# Patient Record
Sex: MALE | Race: WHITE | Employment: FULL TIME | URBAN - METROPOLITAN AREA
[De-identification: names, ages, dates, MRNs, and addresses within clinical notes are randomized per-mention and may not be internally consistent; named-entity substitution may affect disease eponyms.]

---

## 2018-09-18 ENCOUNTER — HOSPITAL ENCOUNTER (OUTPATIENT)
Dept: NON INVASIVE DIAGNOSTICS | Age: 48
Discharge: HOME | End: 2018-09-18
Attending: FAMILY MEDICINE
Payer: COMMERCIAL

## 2018-09-18 DIAGNOSIS — R00.2 PALPITATIONS: ICD-10-CM

## 2018-09-18 PROCEDURE — 93270 REMOTE 30 DAY ECG REV/REPORT: CPT

## 2018-10-26 NOTE — PROCEDURES
[address] [facility]  MR#: [id number]  : 1970  ACCOUNT #: [de-identified]   DATE OF SERVICE: 10/20/2018    DATE OF RECORDIN/21 through 10/20/2018    A total of more than 150 patient triggered events were noted. Rhythm included sinus rhythm, sinus rhythm with PVCs and sinus tachycardia. Symptoms of skipped beat were mostly correlated with PVCs, but occasionally with sinus rhythm.       MD MIGEL Zimmerman / DIMPLE  D: 10/26/2018 08:27     T: 10/26/2018 09:46  JOB #: [id number]

## 2022-09-28 ENCOUNTER — HOSPITAL ENCOUNTER (OUTPATIENT)
Dept: VASCULAR SURGERY | Age: 52
Discharge: HOME | End: 2022-09-28
Attending: FAMILY MEDICINE
Payer: COMMERCIAL

## 2022-09-28 DIAGNOSIS — M79.89 LEG SWELLING: ICD-10-CM

## 2022-09-28 DIAGNOSIS — R00.2 PALPITATIONS: ICD-10-CM

## 2022-09-28 PROCEDURE — 93971 EXTREMITY STUDY: CPT

## 2022-09-28 PROCEDURE — 93880 EXTRACRANIAL BILAT STUDY: CPT

## 2022-09-29 PROBLEM — I77.9 CAROTID ARTERY DISEASE (HCC): Status: ACTIVE | Noted: 2022-09-29

## 2022-09-29 LAB
LEFT CCA DIST DIAS: 29 CM/S
LEFT CCA DIST SYS: 90.4 CM/S
LEFT CCA PROX DIAS: 32.1 CM/S
LEFT CCA PROX SYS: 125.3 CM/S
LEFT ECA DIAS: 18.9 CM/S
LEFT ECA SYS: 148.3 CM/S
LEFT ICA DIST DIAS: 30.3 CM/S
LEFT ICA DIST SYS: 65.5 CM/S
LEFT ICA MID DIAS: 35.8 CM/S
LEFT ICA MID SYS: 77.6 CM/S
LEFT ICA PROX DIAS: 30.3 CM/S
LEFT ICA PROX SYS: 72.1 CM/S
LEFT ICA/CCA SYS: 0.9 NO UNITS
LEFT VERTEBRAL DIAS: 17.6 CM/S
LEFT VERTEBRAL SYS: 45.9 CM/S
RIGHT CCA DIST DIAS: 25.5 CM/S
RIGHT CCA DIST SYS: 90.7 CM/S
RIGHT CCA PROX DIAS: 20.3 CM/S
RIGHT CCA PROX SYS: 93.3 CM/S
RIGHT ECA DIAS: 21.6 CM/S
RIGHT ECA SYS: 103.7 CM/S
RIGHT ICA DIST DIAS: 32 CM/S
RIGHT ICA DIST SYS: 78.9 CM/S
RIGHT ICA MID DIAS: 41.2 CM/S
RIGHT ICA MID SYS: 88.1 CM/S
RIGHT ICA PROX DIAS: 30.7 CM/S
RIGHT ICA PROX SYS: 65.9 CM/S
RIGHT ICA/CCA SYS: 1 NO UNITS
RIGHT VERTEBRAL DIAS: 18.5 CM/S
RIGHT VERTEBRAL SYS: 47.8 CM/S

## 2023-05-23 RX ORDER — TELMISARTAN AND HYDROCHLORTHIAZIDE 80; 25 MG/1; MG/1
1 TABLET ORAL DAILY
COMMUNITY
Start: 2023-02-20

## 2023-05-23 RX ORDER — OMEPRAZOLE 40 MG/1
1 CAPSULE, DELAYED RELEASE ORAL DAILY
COMMUNITY
Start: 2022-09-05

## 2023-05-23 RX ORDER — MOMETASONE FUROATE 50 UG/1
2 SPRAY, METERED NASAL DAILY
COMMUNITY
Start: 2014-02-25

## 2023-05-23 RX ORDER — RIZATRIPTAN BENZOATE 10 MG/1
10 TABLET, ORALLY DISINTEGRATING ORAL
COMMUNITY
Start: 2017-07-18

## 2023-05-23 RX ORDER — AZELASTINE HCL 205.5 UG/1
2 SPRAY NASAL DAILY
COMMUNITY
Start: 2014-02-25

## 2023-08-21 ENCOUNTER — TELEPHONE (OUTPATIENT)
Age: 53
End: 2023-08-21